# Patient Record
Sex: FEMALE | Race: WHITE
[De-identification: names, ages, dates, MRNs, and addresses within clinical notes are randomized per-mention and may not be internally consistent; named-entity substitution may affect disease eponyms.]

---

## 2023-07-25 ENCOUNTER — HOSPITAL ENCOUNTER (EMERGENCY)
Dept: HOSPITAL 47 - EC | Age: 58
Discharge: HOME | End: 2023-07-25
Payer: MEDICARE

## 2023-07-25 VITALS — HEART RATE: 72 BPM | DIASTOLIC BLOOD PRESSURE: 85 MMHG | SYSTOLIC BLOOD PRESSURE: 160 MMHG

## 2023-07-25 VITALS — TEMPERATURE: 98.1 F | RESPIRATION RATE: 18 BRPM

## 2023-07-25 DIAGNOSIS — Z86.73: ICD-10-CM

## 2023-07-25 DIAGNOSIS — F15.90: ICD-10-CM

## 2023-07-25 DIAGNOSIS — Z88.2: ICD-10-CM

## 2023-07-25 DIAGNOSIS — R60.0: ICD-10-CM

## 2023-07-25 DIAGNOSIS — R74.01: Primary | ICD-10-CM

## 2023-07-25 DIAGNOSIS — Z86.59: ICD-10-CM

## 2023-07-25 DIAGNOSIS — Z88.1: ICD-10-CM

## 2023-07-25 DIAGNOSIS — F17.200: ICD-10-CM

## 2023-07-25 LAB
ALBUMIN SERPL-MCNC: 3.8 G/DL (ref 3.5–5)
ALP SERPL-CCNC: 54 U/L (ref 38–126)
ALT SERPL-CCNC: 135 U/L (ref 4–34)
ANION GAP SERPL CALC-SCNC: 8 MMOL/L
APTT BLD: 23.4 SEC (ref 22–30)
AST SERPL-CCNC: 152 U/L (ref 14–36)
BASOPHILS # BLD AUTO: 0 K/UL (ref 0–0.2)
BASOPHILS NFR BLD AUTO: 0 %
BUN SERPL-SCNC: 26 MG/DL (ref 7–17)
CALCIUM SPEC-MCNC: 8.9 MG/DL (ref 8.4–10.2)
CHLORIDE SERPL-SCNC: 102 MMOL/L (ref 98–107)
CO2 SERPL-SCNC: 27 MMOL/L (ref 22–30)
EOSINOPHIL # BLD AUTO: 0.2 K/UL (ref 0–0.7)
EOSINOPHIL NFR BLD AUTO: 3 %
ERYTHROCYTE [DISTWIDTH] IN BLOOD BY AUTOMATED COUNT: 4.56 M/UL (ref 3.8–5.4)
ERYTHROCYTE [DISTWIDTH] IN BLOOD: 13.9 % (ref 11.5–15.5)
GLUCOSE SERPL-MCNC: 95 MG/DL (ref 74–99)
HCT VFR BLD AUTO: 40 % (ref 34–46)
HGB BLD-MCNC: 13.4 GM/DL (ref 11.4–16)
INR PPP: 1 (ref ?–1.2)
LYMPHOCYTES # SPEC AUTO: 3.1 K/UL (ref 1–4.8)
LYMPHOCYTES NFR SPEC AUTO: 40 %
MAGNESIUM SPEC-SCNC: 1.8 MG/DL (ref 1.6–2.3)
MCH RBC QN AUTO: 29.3 PG (ref 25–35)
MCHC RBC AUTO-ENTMCNC: 33.5 G/DL (ref 31–37)
MCV RBC AUTO: 87.6 FL (ref 80–100)
MONOCYTES # BLD AUTO: 0.4 K/UL (ref 0–1)
MONOCYTES NFR BLD AUTO: 5 %
NEUTROPHILS # BLD AUTO: 3.9 K/UL (ref 1.3–7.7)
NEUTROPHILS NFR BLD AUTO: 50 %
PLATELET # BLD AUTO: 203 K/UL (ref 150–450)
POTASSIUM SERPL-SCNC: 4.5 MMOL/L (ref 3.5–5.1)
PROT SERPL-MCNC: 7.5 G/DL (ref 6.3–8.2)
PT BLD: 10.2 SEC (ref 9–12)
SODIUM SERPL-SCNC: 137 MMOL/L (ref 137–145)
WBC # BLD AUTO: 7.7 K/UL (ref 3.8–10.6)

## 2023-07-25 PROCEDURE — 85730 THROMBOPLASTIN TIME PARTIAL: CPT

## 2023-07-25 PROCEDURE — 83735 ASSAY OF MAGNESIUM: CPT

## 2023-07-25 PROCEDURE — 85379 FIBRIN DEGRADATION QUANT: CPT

## 2023-07-25 PROCEDURE — 85025 COMPLETE CBC W/AUTO DIFF WBC: CPT

## 2023-07-25 PROCEDURE — 80053 COMPREHEN METABOLIC PANEL: CPT

## 2023-07-25 PROCEDURE — 99283 EMERGENCY DEPT VISIT LOW MDM: CPT

## 2023-07-25 PROCEDURE — 85610 PROTHROMBIN TIME: CPT

## 2023-07-25 PROCEDURE — 36415 COLL VENOUS BLD VENIPUNCTURE: CPT

## 2023-07-25 PROCEDURE — 96374 THER/PROPH/DIAG INJ IV PUSH: CPT

## 2023-07-25 NOTE — ED
General Adult HPI





- General


Chief complaint: Extremity Injury, Lower


Stated complaint: Leg Pain, History of Kidney Cancer


Time Seen by Provider: 07/25/23 14:56


Source: patient


Mode of arrival: ambulatory


Limitations: no limitations





- History of Present Illness


Initial comments: 





This patient is 57-year-old woman with history of bilateral leg pain.  Patient 

gives history of having right renal cancer which was treated with surgery and 

she states she was told she would need a year of immunotherapy but she was 

noncompliant after a couple of months.  The patient states she also has history 

of right leg DVT that was found in November she is not taking anticoagulation.  

The patient has been having bilateral leg pain going back for weeks now.  She 

states she told staff at [facility], where she is been living 

for a couple of months now and they transferred her here to have evaluation.  

The patient states the pain is aching, bilateral, has been going on for weeks.  

She states it is worse if she is up and moving a lot.  She has not noted 

relieving factors.  No associated symptoms


-: week(s)


Location: left, right, lower extremity


Quality: stabbing


Consistency: constant


Improves with: none


Worsens with: movement


Associated Symptoms: denies other symptoms


Treatments Prior to Arrival: none





- Related Data


                                    Allergies











Allergy/AdvReac Type Severity Reaction Status Date / Time


 


sulfamethoxazole Allergy  Unknown Verified 07/25/23 14:47





[From Bactrim]     


 


trimethoprim [From Bactrim] Allergy  Unknown Verified 07/25/23 14:47














Review of Systems


ROS Statement: 


Those systems with pertinent positive or pertinent negative responses have been 

documented in the HPI.





ROS Other: All systems not noted in ROS Statement are negative.


Constitutional: Denies: fever, chills, weakness


Respiratory: Denies: cough, dyspnea


Cardiovascular: Denies: chest pain, palpitations, edema


Gastrointestinal: Denies: abdominal pain, vomiting, diarrhea


Genitourinary: Denies: dysuria, hematuria


Musculoskeletal: Reports: as per HPI, myalgia.  Denies: back pain


Skin: Denies: rash


Neurological: Denies: headache, weakness





Past Medical History


Past Medical History: CVA/TIA, Renal Disease


Additional Past Medical History / Comment(s): aphasia


History of Any Multi-Drug Resistant Organisms: None Reported


Past Surgical History: Adenoidectomy, Orthopedic Surgery


Additional Past Surgical History / Comment(s): nephrectomy


Past Psychological History: Anxiety, Depression


Smoking Status: Current every day smoker


Past Alcohol Use History: None Reported


Past Drug Use History: Heroin, Methamphetamine





General Exam


Limitations: no limitations


General appearance: alert, in no apparent distress


Head exam: Present: atraumatic, normocephalic


Eye exam: Present: normal appearance.  Absent: scleral icterus, conjunctival 

injection


ENT exam: Present: normal oropharynx


Neck exam: Present: normal inspection


Respiratory exam: Present: normal lung sounds bilaterally.  Absent: respiratory 

distress, wheezes, rales, rhonchi, stridor


Cardiovascular Exam: Present: regular rate, normal rhythm, normal heart sounds. 

Absent: systolic murmur, diastolic murmur, rubs, gallop


GI/Abdominal exam: Present: soft.  Absent: distended, tenderness, guarding, 

rebound, rigid, mass


Extremities exam: Present: normal inspection, normal capillary refill.  Absent: 

pedal edema, calf tenderness


Back exam: Present: normal inspection.  Absent: CVA tenderness (R), CVA 

tenderness (L)


Neurological exam: Present: alert.  Absent: motor sensory deficit


Skin exam: Present: warm, dry, intact, normal color.  Absent: rash





Course


                                   Vital Signs











  07/25/23 07/25/23 07/25/23





  14:44 15:10 17:05


 


Temperature 98.1 F  


 


Pulse Rate 70 76 72


 


Respiratory 18 18 18





Rate   


 


Blood Pressure 152/92 151/91 160/85


 


O2 Sat by Pulse 100 97 98





Oximetry   














Medical Decision Making





- Medical Decision Making





's patient is a 57-year-old woman who reportedly has history of DVT and is sent 

here to have evaluation of bilateral leg pain.  The [facility] paperwork also 

mentions concern about acute kidney failure.  The patient's exam does reveal 

some very mild bilateral edema, really just a trace.  The exam does not reveal 

obvious cause, but do not suspect DVT based on the exam. Workup shows a negative

d-dimer, and that he patient's creatinine is normal.  I was going to recommend 

patient follow-up with oncology as she did not complete treatment related to her

kidney cancer, the patient had been discharged prior to me being able to discuss

the results with her, as I had been called to an ambulance case.





Was pt. sent in by a medical professional or institution (, PA, NP, urgent 

care, hospital, or nursing home...) When possible be specific


@  -Patient is sent from [facility] to have evaluation


Did you speak to anyone other than the patient for history (EMS, parent, family,

police, friend...)? What history was obtained from this source 


@  -[No]


Did you review nursing and triage notes (agree or disagree)?  Why? 


@  -[I reviewed and agree with nursing and triage notes]


Were old charts reviewed (outside hosp., previous admission, EMS record, old 

EKG, old radiological studies, urgent care reports/EKG's, nursing home records)?

Report findings 


@  -[No old charts were reviewed]


Differential Diagnosis (chest pain, altered mental status, abdominal pain women,

abdominal pain men, vaginal bleeding, weakness, fever, dyspnea, syncope, 

headache, dizziness, GI bleed, back pain, seizure, CVA, palpatations, mental 

health, musculoskeletal)? 


@  -[Differential of the patient's leg edema includes DVT, cellulitis, renal 

disease, congestive heart failure, liver disease, lymphatic obstruction, anemia,

 hypoproteinemia, amongst other conditions


EKG interpreted by me (3pts min.).


@  -[As above]


X-rays interpreted by me (1pt min.).


@  -[None done]


CT interpreted by me (1pt min.).


@  -[None done]


U/S interpreted by me (1pt. min.).


@  -[None done]


What testing was considered but not performed or refused? (CT, X-rays, U/S, 

labs)? Why?


@  -[None]


What meds were considered but not given or refused? Why?


@  -[None]


Did you discuss the management of the patient with other professionals 

(professionals i.e. , PA, NP, lab, RT, psych nurse, , , 

teacher, , )? Give summary


@  -[No]


Was smoking cessation discussed for >3mins.?


@  -[No]


Was critical care preformed (if so, how long)?


@  -[No]


Were there social determinants of health that impacted care today? How? 

(Homelessness, low income, unemployed, alcoholism, drug addiction, 

transportation, low edu. Level, literacy, decrease access to med. care, shelter, 

rehab)?


@  -[No]


Was there de-escalation of care discussed even if they declined (Discuss DNR or 

withdrawal of care, Hospice)? DNR status


@  -[No]


What co-morbidities impacted this encounter? (DM, HTN, Smoking, COPD, CAD, 

Cancer, CVA, ARF, Chemo, Hep., AIDS, mental health diagnosis, sleep apnea, 

morbid obesity)?


@  -[None]


Was patient admitted / discharged? Hospital course, mention meds given and 

route, prescriptions, significant lab abnormalities, going to OR and other 

pertinent info.


@  -[Patient discharged to have follow-up with oncology regarding resumption of 

her cancer treatment


Undiagnosed new problem with uncertain prognosis?


@  -[No]


Drug Therapy requiring intensive monitoring for toxicity (Heparin, Nitro, 

Insulin, Cardizem)?


@  -[No]


Were any procedures done?


@  -[No]


Diagnosis/symptom?


@  -[Bilateral leg edema


Elevated transaminases levels


Acute, or Chronic, or Acute on Chronic?


@  -[Acute


Uncomplicated (without systemic symptoms) or Complicated (systemic symptoms)?


@  -[Uncomplicated


Side effects of treatment?


@  -[No]


Exacerbation, Progression, or Severe Exacerbation?


@  -[No]


Poses a threat to life or bodily function? How? (Chest pain, USA, MI, pneumonia,

 PE, COPD, DKA, ARF, appy, cholecystitis, CVA, Diverticulitis, Homicidal, 

Suicidal, threat to staff... and all critical care pts)


@  -[No]





- Lab Data


Result diagrams: 


                                 07/25/23 15:19





                                 07/25/23 15:19


                                   Lab Results











  07/25/23 07/25/23 07/25/23 Range/Units





  15:19 15:19 15:19 


 


WBC  7.7    (3.8-10.6)  k/uL


 


RBC  4.56    (3.80-5.40)  m/uL


 


Hgb  13.4    (11.4-16.0)  gm/dL


 


Hct  40.0    (34.0-46.0)  %


 


MCV  87.6    (80.0-100.0)  fL


 


MCH  29.3    (25.0-35.0)  pg


 


MCHC  33.5    (31.0-37.0)  g/dL


 


RDW  13.9    (11.5-15.5)  %


 


Plt Count  203    (150-450)  k/uL


 


MPV  8.1    


 


Neutrophils %  50    %


 


Lymphocytes %  40    %


 


Monocytes %  5    %


 


Eosinophils %  3    %


 


Basophils %  0    %


 


Neutrophils #  3.9    (1.3-7.7)  k/uL


 


Lymphocytes #  3.1    (1.0-4.8)  k/uL


 


Monocytes #  0.4    (0-1.0)  k/uL


 


Eosinophils #  0.2    (0-0.7)  k/uL


 


Basophils #  0.0    (0-0.2)  k/uL


 


PT   10.2   (9.0-12.0)  sec


 


INR   1.0   (<1.2)  


 


APTT   23.4   (22.0-30.0)  sec


 


D-Dimer   0.36   (<0.60)  mg/L FEU


 


Sodium    137  (137-145)  mmol/L


 


Potassium    4.5  (3.5-5.1)  mmol/L


 


Chloride    102  ()  mmol/L


 


Carbon Dioxide    27  (22-30)  mmol/L


 


Anion Gap    8  mmol/L


 


BUN    26 H  (7-17)  mg/dL


 


Creatinine    0.64  (0.52-1.04)  mg/dL


 


Est GFR (CKD-EPI)AfAm    >90  (>60 ml/min/1.73 sqM)  


 


Est GFR (CKD-EPI)NonAf    >90  (>60 ml/min/1.73 sqM)  


 


Glucose    95  (74-99)  mg/dL


 


Calcium    8.9  (8.4-10.2)  mg/dL


 


Magnesium    1.8  (1.6-2.3)  mg/dL


 


Total Bilirubin    0.5  (0.2-1.3)  mg/dL


 


AST    152 H  (14-36)  U/L


 


ALT    135 H  (4-34)  U/L


 


Alkaline Phosphatase    54  ()  U/L


 


Total Protein    7.5  (6.3-8.2)  g/dL


 


Albumin    3.8  (3.5-5.0)  g/dL














Disposition


Clinical Impression: 


 High transaminase levels, Leg edema





Disposition: HOME SELF-CARE


Condition: Good


Instructions (If sedation given, give patient instructions):  Leg Edema (ED)


Additional Instructions: 


As we discussed, your liver enzymes are mildly elevated.  Follow-up to have this

 level checked in approximately 3 days to ensure that it is not increasing.


Is patient prescribed a controlled substance at d/c from ED?: No


Referrals: 


None,Stated [Primary Care Provider] - 1-2 days


Luther Snyder MD [STAFF PHYSICIAN] - 1-2 days